# Patient Record
Sex: FEMALE | Race: WHITE | Employment: OTHER | URBAN - METROPOLITAN AREA
[De-identification: names, ages, dates, MRNs, and addresses within clinical notes are randomized per-mention and may not be internally consistent; named-entity substitution may affect disease eponyms.]

---

## 2019-07-08 ENCOUNTER — HOSPITAL ENCOUNTER (OUTPATIENT)
Age: 69
Discharge: HOME | End: 2019-07-10
Payer: MEDICARE

## 2019-07-08 ENCOUNTER — OFFICE VISIT (OUTPATIENT)
Dept: RHEUMATOLOGY | Age: 69
End: 2019-07-08
Payer: MEDICARE

## 2019-07-08 VITALS
HEIGHT: 66 IN | WEIGHT: 141.4 LBS | OXYGEN SATURATION: 98 % | BODY MASS INDEX: 22.73 KG/M2 | DIASTOLIC BLOOD PRESSURE: 68 MMHG | TEMPERATURE: 97.6 F | HEART RATE: 91 BPM | SYSTOLIC BLOOD PRESSURE: 124 MMHG

## 2019-07-08 DIAGNOSIS — D86.9 SARCOIDOSIS: Primary | ICD-10-CM

## 2019-07-08 DIAGNOSIS — D86.9 SARCOIDOSIS: ICD-10-CM

## 2019-07-08 LAB
ALBUMIN SERPL-MCNC: 3.6 G/DL (ref 3.5–5.2)
ALP BLD-CCNC: 80 U/L (ref 35–104)
ALT SERPL-CCNC: 10 U/L (ref 0–32)
ANION GAP SERPL CALCULATED.3IONS-SCNC: 16 MMOL/L (ref 7–16)
AST SERPL-CCNC: 22 U/L (ref 0–31)
BACTERIA: ABNORMAL /HPF
BASOPHILS ABSOLUTE: 0.03 E9/L (ref 0–0.2)
BASOPHILS RELATIVE PERCENT: 0.6 % (ref 0–2)
BILIRUB SERPL-MCNC: 0.3 MG/DL (ref 0–1.2)
BILIRUBIN URINE: NEGATIVE
BLOOD, URINE: NEGATIVE
BUN BLDV-MCNC: 33 MG/DL (ref 8–23)
C-REACTIVE PROTEIN: 3.7 MG/DL (ref 0–0.4)
CALCIUM SERPL-MCNC: 10.1 MG/DL (ref 8.6–10.2)
CHLORIDE BLD-SCNC: 103 MMOL/L (ref 98–107)
CLARITY: CLEAR
CO2: 24 MMOL/L (ref 22–29)
COLOR: YELLOW
CREAT SERPL-MCNC: 1.9 MG/DL (ref 0.5–1)
CREATININE URINE: 126 MG/DL (ref 29–226)
EOSINOPHILS ABSOLUTE: 0.35 E9/L (ref 0.05–0.5)
EOSINOPHILS RELATIVE PERCENT: 6.4 % (ref 0–6)
GFR AFRICAN AMERICAN: 32
GFR NON-AFRICAN AMERICAN: 26 ML/MIN/1.73
GLUCOSE BLD-MCNC: 99 MG/DL (ref 74–99)
GLUCOSE URINE: NEGATIVE MG/DL
HCT VFR BLD CALC: 32.9 % (ref 34–48)
HEMOGLOBIN: 10.3 G/DL (ref 11.5–15.5)
IMMATURE GRANULOCYTES #: 0.01 E9/L
IMMATURE GRANULOCYTES %: 0.2 % (ref 0–5)
KETONES, URINE: NEGATIVE MG/DL
LEUKOCYTE ESTERASE, URINE: ABNORMAL
LYMPHOCYTES ABSOLUTE: 2.56 E9/L (ref 1.5–4)
LYMPHOCYTES RELATIVE PERCENT: 47.1 % (ref 20–42)
MCH RBC QN AUTO: 30 PG (ref 26–35)
MCHC RBC AUTO-ENTMCNC: 31.3 % (ref 32–34.5)
MCV RBC AUTO: 95.9 FL (ref 80–99.9)
MONOCYTES ABSOLUTE: 0.47 E9/L (ref 0.1–0.95)
MONOCYTES RELATIVE PERCENT: 8.6 % (ref 2–12)
NEUTROPHILS ABSOLUTE: 2.02 E9/L (ref 1.8–7.3)
NEUTROPHILS RELATIVE PERCENT: 37.1 % (ref 43–80)
NITRITE, URINE: NEGATIVE
PDW BLD-RTO: 14 FL (ref 11.5–15)
PH UA: 6 (ref 5–9)
PLATELET # BLD: 183 E9/L (ref 130–450)
PMV BLD AUTO: 10.2 FL (ref 7–12)
POTASSIUM SERPL-SCNC: 4.6 MMOL/L (ref 3.5–5)
PROTEIN PROTEIN: 15 MG/DL (ref 0–12)
PROTEIN UA: NEGATIVE MG/DL
PROTEIN/CREAT RATIO: 0.1
PROTEIN/CREAT RATIO: 0.1 (ref 0–0.2)
RBC # BLD: 3.43 E12/L (ref 3.5–5.5)
RBC UA: ABNORMAL /HPF (ref 0–2)
RHEUMATOID FACTOR: 11 IU/ML (ref 0–13)
SEDIMENTATION RATE, ERYTHROCYTE: 30 MM/HR (ref 0–20)
SODIUM BLD-SCNC: 143 MMOL/L (ref 132–146)
SPECIFIC GRAVITY UA: 1.01 (ref 1–1.03)
TOTAL PROTEIN: 6.6 G/DL (ref 6.4–8.3)
URIC ACID, SERUM: 8 MG/DL (ref 2.4–5.7)
UROBILINOGEN, URINE: 0.2 E.U./DL
WBC # BLD: 5.4 E9/L (ref 4.5–11.5)
WBC UA: ABNORMAL /HPF (ref 0–5)

## 2019-07-08 PROCEDURE — 82164 ANGIOTENSIN I ENZYME TEST: CPT

## 2019-07-08 PROCEDURE — 86803 HEPATITIS C AB TEST: CPT

## 2019-07-08 PROCEDURE — 82570 ASSAY OF URINE CREATININE: CPT

## 2019-07-08 PROCEDURE — 86140 C-REACTIVE PROTEIN: CPT

## 2019-07-08 PROCEDURE — 86235 NUCLEAR ANTIGEN ANTIBODY: CPT

## 2019-07-08 PROCEDURE — 84550 ASSAY OF BLOOD/URIC ACID: CPT

## 2019-07-08 PROCEDURE — 85025 COMPLETE CBC W/AUTO DIFF WBC: CPT

## 2019-07-08 PROCEDURE — 36415 COLL VENOUS BLD VENIPUNCTURE: CPT

## 2019-07-08 PROCEDURE — 80053 COMPREHEN METABOLIC PANEL: CPT

## 2019-07-08 PROCEDURE — 86704 HEP B CORE ANTIBODY TOTAL: CPT

## 2019-07-08 PROCEDURE — 99203 OFFICE O/P NEW LOW 30 MIN: CPT | Performed by: INTERNAL MEDICINE

## 2019-07-08 PROCEDURE — 86255 FLUORESCENT ANTIBODY SCREEN: CPT

## 2019-07-08 PROCEDURE — 86431 RHEUMATOID FACTOR QUANT: CPT

## 2019-07-08 PROCEDURE — 86225 DNA ANTIBODY NATIVE: CPT

## 2019-07-08 PROCEDURE — 87340 HEPATITIS B SURFACE AG IA: CPT

## 2019-07-08 PROCEDURE — 84156 ASSAY OF PROTEIN URINE: CPT

## 2019-07-08 PROCEDURE — 85651 RBC SED RATE NONAUTOMATED: CPT

## 2019-07-08 PROCEDURE — 81001 URINALYSIS AUTO W/SCOPE: CPT

## 2019-07-08 PROCEDURE — 86706 HEP B SURFACE ANTIBODY: CPT

## 2019-07-08 PROCEDURE — 86200 CCP ANTIBODY: CPT

## 2019-07-08 RX ORDER — FLUOXETINE HYDROCHLORIDE 40 MG/1
CAPSULE ORAL
Refills: 0 | COMMUNITY
Start: 2019-05-02

## 2019-07-08 RX ORDER — PANTOPRAZOLE SODIUM 40 MG/1
20 TABLET, DELAYED RELEASE ORAL
Refills: 0 | COMMUNITY
Start: 2019-05-29

## 2019-07-08 RX ORDER — POTASSIUM CITRATE 10 MEQ/1
TABLET, EXTENDED RELEASE ORAL
Refills: 0 | COMMUNITY
Start: 2019-06-19

## 2019-07-08 RX ORDER — PERPHENAZINE AND AMITRIPTYLINE HYDROCHLORIDE 2; 10 MG/1; MG/1
TABLET, FILM COATED ORAL
Refills: 0 | COMMUNITY
Start: 2019-04-11

## 2019-07-08 RX ORDER — LEVOTHYROXINE SODIUM 0.07 MG/1
TABLET ORAL
Refills: 0 | COMMUNITY
Start: 2019-06-10

## 2019-07-08 RX ORDER — ATORVASTATIN CALCIUM 40 MG/1
TABLET, FILM COATED ORAL
Refills: 0 | COMMUNITY
Start: 2019-07-01

## 2019-07-08 ASSESSMENT — ENCOUNTER SYMPTOMS
EYE ITCHING: 0
PHOTOPHOBIA: 0
EYE REDNESS: 0
DIARRHEA: 1
COUGH: 0
SHORTNESS OF BREATH: 0
SORE THROAT: 0
CHEST TIGHTNESS: 0
VOMITING: 0
BACK PAIN: 0
ABDOMINAL DISTENTION: 1
CONSTIPATION: 0
ABDOMINAL PAIN: 0
WHEEZING: 0
EYE PAIN: 0
BLOOD IN STOOL: 0

## 2019-07-08 NOTE — PROGRESS NOTES
19    Name: Jaylen Talamantes  : 1950  Age: 81 y.o. Sex: female    Patient is seen at the request of Mindy Zapata MD    Patient presents for a rheumatology consultation regarding     Chief Complaint   Patient presents with    Rash     BLE    Joint Pain         Long standing history of Sarcoidosis . Please see following note for additional details. Never been seen by Rheumatologist and Pulmonologist before. Now she would like to establish with rheumatologist due to long standing history pain less , non itchy rash over low extremities. No aggravating and relieving factors. Never been seen by dermatologist for this rash. Mother with h/o sarcoidosis  \"64 year old woman, non-smoker, with bronchitis, episodic dyspnea and cough.    Found to have mediastinal lymphadenopathy and pulmonary nodules. CT scan    shows lymphadenopathy, small lung nodules and an enlarged spleen. PET scan    positive in lymph nodes and lung nodules.  Abdominal CT scan shows    peripancreatic lymphadenopathy. History of Raynaud's and nephrolithiasis.    Maternal history of sarcoidosis. \"Right paratracheal lymph node biopsy is consistent with non necrotizing granuloma. AFB , GMS, PAS, fungal stain is negative.                Vitals:    19 1418   BP: 124/68   Site: Right Upper Arm   Position: Sitting   Pulse: 91   Temp: 97.6 °F (36.4 °C)   TempSrc: Temporal   SpO2: 98%   Weight: 141 lb 6.4 oz (64.1 kg)   Height: 5' 5.5\" (1.664 m)        Review of Systems   Constitutional: Negative for fatigue, fever and unexpected weight change. HENT: Negative for mouth sores, nosebleeds and sore throat. Sinus congestion. Eyes: Negative for photophobia, pain, redness, itching and visual disturbance. Respiratory: Negative for cough, chest tightness, shortness of breath and wheezing. Cardiovascular: Negative for chest pain, palpitations and leg swelling. Gastrointestinal: Positive for abdominal distention and diarrhea.  Negative file    Years of education: Not on file    Highest education level: Not on file   Occupational History    Not on file   Social Needs    Financial resource strain: Not on file    Food insecurity:     Worry: Not on file     Inability: Not on file    Transportation needs:     Medical: Not on file     Non-medical: Not on file   Tobacco Use    Smoking status: Never Smoker    Smokeless tobacco: Never Used   Substance and Sexual Activity    Alcohol use: Not Currently    Drug use: Never    Sexual activity: Not Currently     Partners: Male     Birth control/protection: Post-menopausal   Lifestyle    Physical activity:     Days per week: Not on file     Minutes per session: Not on file    Stress: Not on file   Relationships    Social connections:     Talks on phone: Not on file     Gets together: Not on file     Attends [employer] service: Not on file     Active member of club or organization: Not on file     Attends meetings of clubs or organizations: Not on file     Relationship status: Not on file    Intimate partner violence:     Fear of current or ex partner: Not on file     Emotionally abused: Not on file     Physically abused: Not on file     Forced sexual activity: Not on file   Other Topics Concern    Not on file   Social History Narrative    Not on file      Social History     Social History Narrative    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 Skin:   Multiple areas of circular  erythematous, scaly  rashes over low extremities. Obi Manuel was seen today for rash and joint pain.     Diagnoses and all orders for this

## 2019-07-09 LAB
HBV SURFACE AB TITR SER: NORMAL {TITER}
HEPATITIS B SURFACE ANTIGEN INTERPRETATION: NORMAL
HEPATITIS C ANTIBODY INTERPRETATION: NORMAL

## 2019-07-10 LAB
ANGIOTENSIN CONVERTING ENZYME: 60 U/L (ref 9–67)
ANTI DNA DOUBLE STRANDED: NEGATIVE
CCP IGG ANTIBODIES: 14 UNITS (ref 0–19)

## 2019-07-11 LAB — HEPATITIS B CORE TOTAL ANTIBODY: NONREACTIVE

## 2019-07-12 LAB
ANTI JO-1 IGG: NEGATIVE
ANTI-MITOCHONDRIAL AB, IFA: NEGATIVE
ENA TO RNP ANTIBODY: NEGATIVE
ENA TO SMITH (SM) ANTIBODY: NEGATIVE
ENA TO SSA (RO) ANTIBODY: NEGATIVE
ENA TO SSB (LA) ANTIBODY: NEGATIVE
SCLERODERMA (SCL-70) AB: NEGATIVE

## 2019-07-18 ENCOUNTER — OFFICE VISIT (OUTPATIENT)
Dept: RHEUMATOLOGY | Age: 69
End: 2019-07-18
Payer: MEDICARE

## 2019-07-18 VITALS
OXYGEN SATURATION: 99 % | SYSTOLIC BLOOD PRESSURE: 120 MMHG | WEIGHT: 138.8 LBS | DIASTOLIC BLOOD PRESSURE: 64 MMHG | HEIGHT: 66 IN | BODY MASS INDEX: 22.31 KG/M2 | HEART RATE: 94 BPM | TEMPERATURE: 97.7 F

## 2019-07-18 DIAGNOSIS — D86.9 SARCOIDOSIS: ICD-10-CM

## 2019-07-18 PROCEDURE — 99214 OFFICE O/P EST MOD 30 MIN: CPT | Performed by: INTERNAL MEDICINE

## 2019-07-18 RX ORDER — BETHANECHOL CHLORIDE 25 MG/1
TABLET ORAL
Refills: 0 | COMMUNITY
Start: 2019-07-08

## 2019-07-18 ASSESSMENT — ENCOUNTER SYMPTOMS
PHOTOPHOBIA: 0
VOMITING: 0
BACK PAIN: 0
ABDOMINAL DISTENTION: 1
COUGH: 0
EYE REDNESS: 0
EYE ITCHING: 0
SHORTNESS OF BREATH: 0
DIARRHEA: 1
SORE THROAT: 0
ABDOMINAL PAIN: 0
EYE PAIN: 0
CONSTIPATION: 0
WHEEZING: 0
BLOOD IN STOOL: 0
CHEST TIGHTNESS: 0

## 2021-06-21 LAB
ANION GAP SERPL CALCULATED.3IONS-SCNC: 11 MEQ/L (ref 3–11)
BANDED NEUTROPHILS RELATIVE PERCENT: 2 % (ref 0–6)
BASOPHILS ABSOLUTE: 0.17 K/UL (ref 0–0.2)
BASOPHILS RELATIVE PERCENT: 2 % (ref 0–1.5)
BUN BLDV-MCNC: 36 MG/DL (ref 8–21)
CALCIUM SERPL-MCNC: 9.8 MG/DL (ref 8.5–10.5)
CELLS COUNTED: 100
CHLORIDE BLD-SCNC: 102 MEQ/L (ref 98–107)
CO2: 26 MEQ/L (ref 21–31)
CREAT SERPL-MCNC: 1.9 MG/DL (ref 0.4–1)
CREATININE + EGFR PANEL: 32 ML/MIN
CREATININE URINE: 102.4 MG/DL (ref 22–328)
DIFFERENTIAL, MANUAL: MANUAL DIFF
EOSINOPHILS ABSOLUTE: 0.26 K/UL (ref 0–0.33)
EOSINOPHILS RELATIVE PERCENT: 3 % (ref 0–3)
GFR NON-AFRICAN AMERICAN: 26 ML/MIN
GLUCOSE BLD-MCNC: 110 MG/DL (ref 70–99)
HCT VFR BLD CALC: 31.6 % (ref 36–44)
HEMOGLOBIN: 10.6 G/DL (ref 12–15)
LYMPHOCYTES # BLD: 55 % (ref 24–44)
LYMPHOCYTES ABSOLUTE: 4.78 K/UL (ref 1.1–4.8)
MCH RBC QN AUTO: 31.9 PG (ref 28–34)
MCHC RBC AUTO-ENTMCNC: 33.6 G/DL (ref 33–37)
MCV RBC AUTO: 95 FL (ref 80–100)
MONOCYTES # BLD: 5 % (ref 3.4–9)
MONOCYTES ABSOLUTE: 0.43 K/UL (ref 0.2–0.7)
NEUTROPHILS ABSOLUTE: 3.04 K/UL (ref 1.83–8.7)
PDW BLD-RTO: 14.5 % (ref 10.9–14.3)
PLATELET # BLD: 218 K/UL (ref 150–450)
PLATELET ESTIMATE: ABNORMAL
PMV BLD AUTO: 7.1 FL (ref 7.4–10.4)
POTASSIUM SERPL-SCNC: 4.3 MEQ/L (ref 3.6–5)
PROTEIN/CREAT RATIO URINE RAN: 88 MG/G
RBC # BLD: 3.33 M/UL (ref 4–4.9)
RBC COMMENT: ABNORMAL
SEG NEUTROPHILS: 33 % (ref 40–74)
SODIUM BLD-SCNC: 139 MEQ/L (ref 135–145)
TOTAL PROTEIN, URINE: 9 MG/DL
WBC # BLD: 8.7 K/UL (ref 4.5–11)

## 2021-10-05 LAB
25-HYDROXY VITAMIN D-3: 64.9 NG/ML (ref 30–100)
A/G RATIO: 1.9 RATIO (ref 1.1–2.2)
ALBUMIN SERPL-MCNC: 4.3 G/DL (ref 3.4–4.8)
ALP BLD-CCNC: 59 U/L (ref 42–121)
ALT SERPL-CCNC: 17 U/L (ref 10–54)
ANION GAP SERPL CALCULATED.3IONS-SCNC: 9 MEQ/L (ref 3–11)
AST SERPL-CCNC: 27 U/L (ref 10–41)
BANDED NEUTROPHILS RELATIVE PERCENT: 4 % (ref 0–6)
BASOPHILS ABSOLUTE: 0.11 K/UL (ref 0–0.2)
BASOPHILS RELATIVE PERCENT: 2 % (ref 0–1.5)
BILIRUB SERPL-MCNC: 0.8 MG/DL (ref 0.3–1.5)
BUN BLDV-MCNC: 29 MG/DL (ref 8–21)
CALCIUM SERPL-MCNC: 10.2 MG/DL (ref 8.5–10.5)
CELLS COUNTED: 100
CHLORIDE BLD-SCNC: 103 MEQ/L (ref 98–107)
CO2: 29 MEQ/L (ref 21–31)
CREAT SERPL-MCNC: 1.9 MG/DL (ref 0.4–1)
CREATININE + EGFR PANEL: 32 ML/MIN
CREATININE URINE: 107.8 MG/DL (ref 22–328)
DIFFERENTIAL, MANUAL: MANUAL DIFF
EOSINOPHILS ABSOLUTE: 0.38 K/UL (ref 0–0.33)
EOSINOPHILS RELATIVE PERCENT: 7 % (ref 0–3)
GFR NON-AFRICAN AMERICAN: 26 ML/MIN
GLOBULIN: 2.3 G/DL (ref 1.9–3.9)
GLUCOSE BLD-MCNC: 85 MG/DL (ref 70–99)
HCT VFR BLD CALC: 33.8 % (ref 36–44)
HEMOGLOBIN: 11.3 G/DL (ref 12–15)
LYMPHOCYTES # BLD: 50 % (ref 24–44)
LYMPHOCYTES ABSOLUTE: 2.75 K/UL (ref 1.1–4.8)
MAGNESIUM: 1.5 MEQ/L (ref 1.6–2.6)
MCH RBC QN AUTO: 31.1 PG (ref 28–34)
MCHC RBC AUTO-ENTMCNC: 33.6 G/DL (ref 33–37)
MCV RBC AUTO: 92.8 FL (ref 80–100)
MONOCYTES # BLD: 3 % (ref 3.4–9)
MONOCYTES ABSOLUTE: 0.16 K/UL (ref 0.2–0.7)
NEUTROPHILS ABSOLUTE: 2.09 K/UL (ref 1.83–8.7)
PDW BLD-RTO: 14.2 % (ref 10.9–14.3)
PHOSPHORUS: 3.2 MG/DL (ref 2.5–4.6)
PLATELET # BLD: 161 K/UL (ref 150–450)
PLATELET ESTIMATE: ABNORMAL
PMV BLD AUTO: 6.9 FL (ref 7.4–10.4)
POTASSIUM SERPL-SCNC: 4.5 MEQ/L (ref 3.6–5)
PROTEIN/CREAT RATIO URINE RAN: 130 MG/G
RBC # BLD: 3.64 M/UL (ref 4–4.9)
RBC COMMENT: ABNORMAL
SEG NEUTROPHILS: 34 % (ref 40–74)
SODIUM BLD-SCNC: 141 MEQ/L (ref 135–145)
TOTAL PROTEIN, URINE: 14 MG/DL
TOTAL PROTEIN: 6.6 G/DL (ref 5.9–7.8)
URATE: 7.7 MG/DL (ref 2.3–6.6)
WBC # BLD: 5.5 K/UL (ref 4.5–11)

## 2021-10-07 LAB — VITAMIN D 1,25-DIHYDROXY: 85 PG/ML (ref 19.9–79.3)

## 2022-03-29 LAB
A/G RATIO: 1.7 RATIO (ref 1.1–2.2)
ALBUMIN SERPL-MCNC: 3.7 G/DL (ref 3.4–4.8)
ALP BLD-CCNC: 63 U/L (ref 42–121)
ALT SERPL-CCNC: 14 U/L (ref 10–54)
ANION GAP SERPL CALCULATED.3IONS-SCNC: 9 MEQ/L (ref 3–11)
AST SERPL-CCNC: 23 U/L (ref 10–41)
BANDED NEUTROPHILS RELATIVE PERCENT: 0 % (ref 0–6)
BASOPHILS ABSOLUTE: 0 K/UL (ref 0–0.2)
BASOPHILS RELATIVE PERCENT: 0 % (ref 0–1.5)
BILIRUB SERPL-MCNC: 0.4 MG/DL (ref 0.3–1.5)
BUN BLDV-MCNC: 31 MG/DL (ref 8–21)
CALCIUM SERPL-MCNC: 9.4 MG/DL (ref 8.5–10.5)
CELLS COUNTED: 100
CHLORIDE BLD-SCNC: 102 MEQ/L (ref 98–107)
CO2: 26 MEQ/L (ref 21–31)
CREAT SERPL-MCNC: 2.1 MG/DL (ref 0.4–1)
CREATININE + EGFR PANEL: 28 ML/MIN
CREATININE URINE: 194.8 MG/DL (ref 22–328)
DIFFERENTIAL, MANUAL: MANUAL DIFF
EOSINOPHILS ABSOLUTE: 0.62 K/UL (ref 0–0.33)
EOSINOPHILS RELATIVE PERCENT: 6 % (ref 0–3)
GFR NON-AFRICAN AMERICAN: 23 ML/MIN
GLOBULIN: 2.2 G/DL (ref 1.9–3.9)
GLUCOSE BLD-MCNC: 81 MG/DL (ref 70–99)
HCT VFR BLD CALC: 31.1 % (ref 36–44)
HEMOGLOBIN: 10.1 G/DL (ref 12–15)
LYMPHOCYTES # BLD: 42 % (ref 24–44)
LYMPHOCYTES ABSOLUTE: 4.36 K/UL (ref 1.1–4.8)
MAGNESIUM: 1.5 MEQ/L (ref 1.6–2.6)
MCH RBC QN AUTO: 30.9 PG (ref 28–34)
MCHC RBC AUTO-ENTMCNC: 32.6 G/DL (ref 33–37)
MCV RBC AUTO: 94.9 FL (ref 80–100)
MICROALBUMIN UR-MCNC: 48.6 UG/ML
MICROALBUMIN/CREAT UR-RTO: 24.9 MG/G
MONOCYTES # BLD: 6 % (ref 3.4–9)
MONOCYTES ABSOLUTE: 0.62 K/UL (ref 0.2–0.7)
NEUTROPHILS ABSOLUTE: 4.78 K/UL (ref 1.83–8.7)
PDW BLD-RTO: 14.5 % (ref 10.9–14.3)
PHOSPHORUS: 3.5 MG/DL (ref 2.5–4.6)
PLATELET # BLD: 195 K/UL (ref 150–450)
PLATELET ESTIMATE: ABNORMAL
PMV BLD AUTO: 6.9 FL (ref 7.4–10.4)
POTASSIUM SERPL-SCNC: 4.9 MEQ/L (ref 3.6–5)
PROTEIN/CREAT RATIO URINE RAN: 77 MG/G
RBC # BLD: 3.28 M/UL (ref 4–4.9)
RBC COMMENT: ABNORMAL
SEG NEUTROPHILS: 46 % (ref 40–74)
SODIUM BLD-SCNC: 137 MEQ/L (ref 135–145)
TOTAL PROTEIN, URINE: 15 MG/DL
TOTAL PROTEIN: 5.9 G/DL (ref 5.9–7.8)
URATE: 8 MG/DL (ref 2.3–6.6)
WBC # BLD: 10.4 K/UL (ref 4.5–11)

## 2022-08-16 LAB
A/G RATIO: 1.7 RATIO (ref 1.1–2.2)
ALBUMIN SERPL-MCNC: 3.9 G/DL (ref 3.4–4.8)
ALP BLD-CCNC: 57 U/L (ref 42–121)
ALT SERPL-CCNC: 14 U/L (ref 10–54)
ANION GAP SERPL CALCULATED.3IONS-SCNC: 5 MEQ/L (ref 3–11)
AST SERPL-CCNC: 21 U/L (ref 10–41)
BANDED NEUTROPHILS RELATIVE PERCENT: 1 % (ref 0–6)
BASOPHILS ABSOLUTE: 0.05 K/UL (ref 0–0.2)
BASOPHILS RELATIVE PERCENT: 1 % (ref 0–1.5)
BILIRUB SERPL-MCNC: 0.7 MG/DL (ref 0.3–1.5)
BUN BLDV-MCNC: 30 MG/DL (ref 8–21)
CALCIUM SERPL-MCNC: 9.5 MG/DL (ref 8.5–10.5)
CELLS COUNTED: 100
CHLORIDE BLD-SCNC: 102 MEQ/L (ref 98–107)
CO2: 31 MEQ/L (ref 21–31)
CREAT SERPL-MCNC: 1.9 MG/DL (ref 0.4–1)
CREATININE + EGFR PANEL: 32 ML/MIN
CREATININE URINE: 143.1 MG/DL (ref 22–328)
DIFFERENTIAL, MANUAL: MANUAL DIFF
EOSINOPHILS ABSOLUTE: 0.27 K/UL (ref 0–0.33)
EOSINOPHILS RELATIVE PERCENT: 5 % (ref 0–3)
GFR NON-AFRICAN AMERICAN: 26 ML/MIN
GLOBULIN: 2.3 G/DL (ref 1.9–3.9)
GLUCOSE BLD-MCNC: 96 MG/DL (ref 70–99)
HCT VFR BLD CALC: 33.1 % (ref 36–44)
HEMOGLOBIN: 10.8 G/DL (ref 12–15)
LYMPHOCYTES # BLD: 48 % (ref 24–44)
LYMPHOCYTES ABSOLUTE: 2.59 K/UL (ref 1.1–4.8)
MCH RBC QN AUTO: 31 PG (ref 28–34)
MCHC RBC AUTO-ENTMCNC: 32.7 G/DL (ref 33–37)
MCV RBC AUTO: 94.7 FL (ref 80–100)
MONOCYTES # BLD: 5 % (ref 3.4–9)
MONOCYTES ABSOLUTE: 0.27 K/UL (ref 0.2–0.7)
NEUTROPHILS ABSOLUTE: 2.21 K/UL (ref 1.83–8.7)
PDW BLD-RTO: 14.2 % (ref 10.9–14.3)
PHOSPHORUS: 3.6 MG/DL (ref 2.5–4.6)
PLATELET # BLD: 171 K/UL (ref 150–450)
PLATELET ESTIMATE: ABNORMAL
PMV BLD AUTO: 7.7 FL (ref 7.4–10.4)
POTASSIUM SERPL-SCNC: 4.6 MEQ/L (ref 3.6–5)
PROTEIN/CREAT RATIO URINE RAN: 77 MG/G
RBC # BLD: 3.5 M/UL (ref 4–4.9)
RBC COMMENT: ABNORMAL
SEG NEUTROPHILS: 40 % (ref 40–74)
SODIUM BLD-SCNC: 138 MEQ/L (ref 135–145)
TOTAL PROTEIN, URINE: 11 MG/DL
TOTAL PROTEIN: 6.2 G/DL (ref 5.9–7.8)
URATE: 7.4 MG/DL (ref 2.3–6.6)
WBC # BLD: 5.4 K/UL (ref 4.5–11)

## 2022-12-12 ENCOUNTER — OFFICE VISIT (OUTPATIENT)
Dept: PULMONOLOGY | Age: 72
End: 2022-12-12
Payer: MEDICARE

## 2022-12-12 VITALS
OXYGEN SATURATION: 100 % | DIASTOLIC BLOOD PRESSURE: 68 MMHG | TEMPERATURE: 97.5 F | RESPIRATION RATE: 26 BRPM | HEIGHT: 66 IN | WEIGHT: 127 LBS | HEART RATE: 90 BPM | BODY MASS INDEX: 20.41 KG/M2 | SYSTOLIC BLOOD PRESSURE: 126 MMHG

## 2022-12-12 DIAGNOSIS — R59.9 ADENOPATHY: ICD-10-CM

## 2022-12-12 DIAGNOSIS — R05.3 CHRONIC COUGH: ICD-10-CM

## 2022-12-12 DIAGNOSIS — Z85.3 HISTORY OF BREAST CANCER: ICD-10-CM

## 2022-12-12 DIAGNOSIS — D86.9 SARCOIDOSIS: Primary | ICD-10-CM

## 2022-12-12 PROCEDURE — 99203 OFFICE O/P NEW LOW 30 MIN: CPT | Performed by: INTERNAL MEDICINE

## 2022-12-12 PROCEDURE — 1123F ACP DISCUSS/DSCN MKR DOCD: CPT | Performed by: INTERNAL MEDICINE

## 2022-12-12 NOTE — PROGRESS NOTES
Patient will be scheduled for a  bronch with EBUS. Patient was ordered labs that she will have done in [facility]. Record will be obtained from Dr. Adwoa Riggs in [facility]. [phone]  Scripts for labs given to patient's sister.

## 2022-12-12 NOTE — PATIENT INSTRUCTIONS
[facility]  [address] [facility], [address]  Office: [phone]      Your were seen in the office today for Sarcoidosis, enlarged lymph nodes      We  did not make changes to your medications today. Testing ordered today was Labwork      Vaccines recommended none    We will schedule you for a bronchoscopy with endobronchial ultrasound and biopsy after Jan 1st 2023. Please do not hesitate to call the office with any questions.

## 2022-12-13 NOTE — PROGRESS NOTES
[facility]     HISTORY OF PRESENT ILLNESS:    Hodan Broussard is a 81y.o. year old female here for evaluation of adenopathy with increased uptake on PET scan. Patient seen and examined she is accompanied by her sister and her . She was previously with sarcoidosis approximately 10 years ago also previously had breast cancer in 2009. Apparently for the last few months she has had increased coughing and shortness of breath she was being seen by Dr. Alley Rcie nurse practitioner Cammie Chapman. A CT of the chest had been ordered which had shown some adenopathy and some very small nodules a PET scan was then ordered which did show uptake in numerous areas. The patient denies any fevers, chills, night sweats, unintentional weight loss. She does continue to have a persistent cough. She was recently seen by a dermatologist and reports that the lesions on her legs were secondary to sarcoidosis. ALLERGIES:    Allergies   Allergen Reactions    Demerol Hcl [Meperidine]        PAST MEDICAL HISTORY: No past medical history on file. MEDICATIONS:   Current Outpatient Medications   Medication Sig Dispense Refill    ipratropium-albuterol (DUONEB) 0.5-2.5 (3) MG/3ML SOLN nebulizer solution Inhale 3 mLs into the lungs every 4 hours 480 mL 4    bethanechol (URECHOLINE) 25 MG tablet take 1 tablet by mouth twice a day  0    atorvastatin (LIPITOR) 40 MG tablet take 1 tablet by mouth once daily  0    FLUoxetine (PROZAC) 40 MG capsule   0    levothyroxine (SYNTHROID) 75 MCG tablet   0    pantoprazole (PROTONIX) 40 MG tablet 20 mg   0    perphenazine-amitriptyline (ETRAFON;TRIAVIL) 2-10 MG per tablet TAKE 1 TABLET BY MOUTH AT BEDTIME  0    potassium citrate (UROCIT-K) 10 MEQ (1080 MG) extended release tablet TAKE 2 TABLETS BY MOUTH TWICE DAILY  0     No current facility-administered medications for this visit.        SOCIAL AND OCCUPATIONAL HEALTH: The patient is a non-smoker[place] She currently has no pets she denies any occupational exposure    SURGICAL HISTORY:   Past Surgical History:   Procedure Laterality Date    BREAST LUMPECTOMY Right     REFRACTIVE SURGERY         FAMILY HISTORY: No family history of cancer, blood clots patient's mother did have sarcoidosis    REVIEW OF SYSTEMS:  Constitutional: No fevers, chills, unintentional weight loss  Skin: Positive for multiple areas of hyperpigmentation on the bilateral lower extremities. There is also a wound from a prior skin biopsy  EENT: No change in vision, change in hearing, change in taste, change in smell  Cardiovascular: Denies chest pain, chest pressure, palpitations  Respiration: Denies wheezing, shortness of breath, positive for cough, denies hemoptysis  Gastrointestinal: Denies nausea, vomiting, diarrhea  Musculoskeletal: Denies joint or muscle pain  Neurological: Denies syncope, headache, seizures  Psychological: Denies anxiety or depression  Endocrine: Denies polyuria polydipsia  Hematopoietic/lymphatic: Denies easy bruising        PHYSICAL EXAMINATION:  Constitutional: Well-nourished, well-developed. No acute distress  EENT: PERRL, EOMI, no oropharyngeal erythema. No palpable adenopathy  Neck: Trachea and thyroid midline  Respiratory: Overall diminished bilaterally patient with dry cough  Cardiovascular: Regular rate and rhythm, no murmurs rubs or gallops  Pulses: Equal bilaterally  Abdomen: Soft nontender bowel sounds present  Lymphatic: No palpable adenopathy  Musculoskeletal: Gait slightly unsteady. Extremities: Patient with swelling of left leg noted per the patient and her sister they report that this is much improved. She does have approximately 1 cm x 1 cm wound on the anterior shin that they report is from a previous biopsy. Skin: Multiple hyperpigmented areas of lower extremities bilaterally  Neurological/Psychiatric: Neurologically intact, no focal deficits. Affect appropriate    DATA: No spirometry completed at this visit as patient has prior spirometry from Dr. Chelsey Darnell office    IMPRESSION:       1. Chronic cough  2. History of sarcoidosis  3. PET scan with positive uptake in superior mediastinal, anterior mediastinal, pretracheal, paratracheal, periaortic, and subcarinal lymph nodes. PLAN:      1. Risks and benefits of bronchoscopy with endobronchial ultrasound and fine-needle aspiration discussed with patient and her  and her sister. They are agreeable to proceed at this time per their request they will be scheduled after the first of the year. 2.  Case discussed with Dr. Clarence Thompson based on patient's prior history likely consistent with her former diagnosis of sarcoidosis however given her history of breast cancer biopsy is recommended at this time  3. Patient to continue DuoNebs and inhalers as per her primary pulmonologist  4. Rheumatoid factor, ACE level, CMP, and urine calcium ordered      I hope this updates you on my evaluation and clinical thinking. Thank you for allowing me to participate in his care.      Sincerely,        Kate Zaidi.  Office: [phone]  Fax: [phone]

## 2023-01-09 RX ORDER — VITAMIN B COMPLEX
1 CAPSULE ORAL DAILY
COMMUNITY

## 2023-01-09 RX ORDER — AMITRIPTYLINE HYDROCHLORIDE 10 MG/1
20 TABLET, FILM COATED ORAL NIGHTLY
COMMUNITY

## 2023-01-09 RX ORDER — MEMANTINE HYDROCHLORIDE 5 MG/1
5 TABLET ORAL DAILY
COMMUNITY

## 2023-01-09 NOTE — PROGRESS NOTES
[address]   PRE-ADMISSION TESTING GENERAL INSTRUCTIONS  [facility] Phone Number: [phone]      GENERAL INSTRUCTIONS:    [x] Antibacterial Soap Shower Night before or AM of surgery. [] CHG Wipes instruction sheet and wipes given. [] Hibiclens shower the night before and the morning of surgery.   -Do not use Hibiclens on your face or head. [x] Do not wear makeup, lotions, powders, deodorant. [x] Nothing by mouth after midnight; including gum, candy, mints, or water. [x] You may brush your teeth, gargle, but do NOT swallow water. [x] No tobacco products, illegal drugs, or alcohol within 24 hours of your surgery. [x] Jewelry or valuables should not be brought to the hospital. All body and/or tongue piercing's must be removed prior to arriving to hospital. No contact lens or hair pins. [x] Arrange transportation with a responsible adult  to and from the hospital. Arrange for someone to be with you for the remainder of the day and for 24 hours after your procedure due to having had anesthesia when discharged.           -Who will be your  for transportation? Claudia-sister         -Who will be staying with you for 24 hrs after your procedure? Claudia-sister  [x] "[employer] card and photo ID. [x] Bring copy of living will or healthcare power of  papers to be placed in your electronic record. [] Urine Pregnancy test will be preformed the day of surgery. A specimen sample may be brought from home. [] Transfusion Bracelet: Please bring with you to hospital, day of surgery. PARKING INSTRUCTIONS:     [x] ARRIVAL DATE & TIME:  1/13 at 10:40 am    [x] Enter into the [facility]. Two people may accompany you. Masks are not required but are recommended. [x] Parking Lot \"I\" is where you will park. It is located on the corner of [facility] [facility] and [address] [facility]. The entrance is on [address].    Upon entering the parking lot, a voucher ticket will print    EDUCATION INSTRUCTIONS:        [] Knee or Hip replacement booklet & exercise pamphlets given. [] [address] placed in chart. [] Pre-admission Testing educational folder given  [] Incentive Spirometry,coughing & deep breathing exercises reviewed. [] Medication information sheet(s)   [] Fluoroscopy-Xray used in surgery reviewed with patient. Educational pamphlet placed in chart. [] Pain: Post-op pain is normal and to be expected. You will be asked to rate your pain from 0-10. [] Joint camp offered. [] Joint replacement booklets given.  [] Spine Navigator to see in PAT. [] Other:___________________________    MEDICATION INSTRUCTIONS:    [x] Bring a complete list of your medications, please write the last time you took the medicine, give this list to the nurse in Pre-Op. [x] Take only the following medications the morning of surgery with 1-2 ounces of water:  Prozac; Protonix    [x] Stop all herbal supplements and vitamins 5 days before surgery. Stop NSAIDS 7 days before surgery. [] DO NOT take any diabetic medicine the morning of surgery. Follow instructions for insulin the day before surgery. [] If you are diabetic and your blood sugar is low or you feel symptomatic, you may drink 1-2 ounces of apple juice or take a glucose tablet.            -The morning of your procedure, you may call the pre-op area if you have concerns about your blood sugar [phone]. [x] Use your inhalers the morning of surgery. Bring your emergency inhaler with you day of surgery. Duoneb nebulizer if needed  [x] Follow physician instructions regarding any blood thinners you may be taking. WHAT TO EXPECT:    [x] The day of surgery you will be greeted and checked in by the [employer].  In addition, you will be registered in the [facility] by a Patient Access Representative. Please bring your photo ID and insurance card.  A nurse will greet you in accordance to the time you are needed in the pre-op area to prepare you for surgery. Please do not be discouraged if you are not greeted in the order you arrive as there are many variables that are involved in patient preparation. Your patience is greatly appreciated as you wait for your nurse. Please bring in items such as: books, magazines, newspapers, electronics, or any other items  to occupy your time in the waiting area. [x]  Delays may occur with surgery and staff will make a sincere effort to keep you informed of delays. If any delays occur with your procedure, we apologize ahead of time for your inconvenience as we recognize the value of your time.

## 2023-01-12 ENCOUNTER — ANESTHESIA EVENT (OUTPATIENT)
Dept: ENDOSCOPY | Age: 73
End: 2023-01-12
Payer: MEDICARE

## 2023-01-13 ENCOUNTER — HOSPITAL ENCOUNTER (OUTPATIENT)
Age: 73
Setting detail: OUTPATIENT SURGERY
Discharge: HOME | End: 2023-01-13
Attending: INTERNAL MEDICINE | Admitting: INTERNAL MEDICINE
Payer: MEDICARE

## 2023-01-13 ENCOUNTER — ANESTHESIA (OUTPATIENT)
Dept: ENDOSCOPY | Age: 73
End: 2023-01-13
Payer: MEDICARE

## 2023-01-13 VITALS
DIASTOLIC BLOOD PRESSURE: 63 MMHG | HEART RATE: 70 BPM | HEIGHT: 65 IN | TEMPERATURE: 97.1 F | WEIGHT: 126 LBS | OXYGEN SATURATION: 96 % | SYSTOLIC BLOOD PRESSURE: 136 MMHG | RESPIRATION RATE: 14 BRPM | BODY MASS INDEX: 20.99 KG/M2

## 2023-01-13 DIAGNOSIS — R91.1 COIN LESION: ICD-10-CM

## 2023-01-13 LAB
APPEARANCE FLUID: NORMAL
CELL COUNT FLUID TYPE: NORMAL
COLOR FLUID: COLORLESS
EKG ATRIAL RATE: 75 BPM
EKG P AXIS: 62 DEGREES
EKG P-R INTERVAL: 152 MS
EKG Q-T INTERVAL: 400 MS
EKG QRS DURATION: 88 MS
EKG QTC CALCULATION (BAZETT): 446 MS
EKG R AXIS: 34 DEGREES
EKG T AXIS: 54 DEGREES
EKG VENTRICULAR RATE: 75 BPM
HCT VFR BLD CALC: 32.4 % (ref 34–48)
HEMOGLOBIN: 10.4 G/DL (ref 11.5–15.5)
INR BLD: 1.1
MCH RBC QN AUTO: 29.9 PG (ref 26–35)
MCHC RBC AUTO-ENTMCNC: 32.1 % (ref 32–34.5)
MCV RBC AUTO: 93.1 FL (ref 80–99.9)
MONOCYTE, FLUID: 67 %
NEUTROPHIL, FLUID: 33 %
NUCLEATED CELLS FLUID: 198 /UL
PDW BLD-RTO: 14.2 FL (ref 11.5–15)
PLATELET # BLD: 245 E9/L (ref 130–450)
PMV BLD AUTO: 8.9 FL (ref 7–12)
PROTHROMBIN TIME: 11.9 SEC (ref 9.3–12.4)
RBC # BLD: 3.48 E12/L (ref 3.5–5.5)
RBC FLUID: <2000 /UL
WBC # BLD: 9.1 E9/L (ref 4.5–11.5)

## 2023-01-13 PROCEDURE — 7100000001 HC PACU RECOVERY - ADDTL 15 MIN: Performed by: INTERNAL MEDICINE

## 2023-01-13 PROCEDURE — 2500000003 HC RX 250 WO HCPCS

## 2023-01-13 PROCEDURE — 87186 SC STD MICRODIL/AGAR DIL: CPT

## 2023-01-13 PROCEDURE — 85610 PROTHROMBIN TIME: CPT

## 2023-01-13 PROCEDURE — 31624 DX BRONCHOSCOPE/LAVAGE: CPT | Performed by: INTERNAL MEDICINE

## 2023-01-13 PROCEDURE — 88173 CYTOPATH EVAL FNA REPORT: CPT

## 2023-01-13 PROCEDURE — 31645 BRNCHSC W/THER ASPIR 1ST: CPT | Performed by: INTERNAL MEDICINE

## 2023-01-13 PROCEDURE — 3609020000 HC BRONCHOSCOPY W/EBUS FNA: Performed by: INTERNAL MEDICINE

## 2023-01-13 PROCEDURE — 3700000001 HC ADD 15 MINUTES (ANESTHESIA): Performed by: INTERNAL MEDICINE

## 2023-01-13 PROCEDURE — 87102 FUNGUS ISOLATION CULTURE: CPT

## 2023-01-13 PROCEDURE — 7100000011 HC PHASE II RECOVERY - ADDTL 15 MIN: Performed by: INTERNAL MEDICINE

## 2023-01-13 PROCEDURE — 2709999900 HC NON-CHARGEABLE SUPPLY: Performed by: INTERNAL MEDICINE

## 2023-01-13 PROCEDURE — 6360000002 HC RX W HCPCS: Performed by: INTERNAL MEDICINE

## 2023-01-13 PROCEDURE — 93010 ELECTROCARDIOGRAM REPORT: CPT | Performed by: INTERNAL MEDICINE

## 2023-01-13 PROCEDURE — 3609010800 HC BRONCHOSCOPY ALVEOLAR LAVAGE: Performed by: INTERNAL MEDICINE

## 2023-01-13 PROCEDURE — 89051 BODY FLUID CELL COUNT: CPT

## 2023-01-13 PROCEDURE — 36415 COLL VENOUS BLD VENIPUNCTURE: CPT

## 2023-01-13 PROCEDURE — 31652 BRONCH EBUS SAMPLNG 1/2 NODE: CPT | Performed by: INTERNAL MEDICINE

## 2023-01-13 PROCEDURE — 87205 SMEAR GRAM STAIN: CPT

## 2023-01-13 PROCEDURE — 87015 SPECIMEN INFECT AGNT CONCNTJ: CPT

## 2023-01-13 PROCEDURE — 2580000003 HC RX 258: Performed by: INTERNAL MEDICINE

## 2023-01-13 PROCEDURE — 93005 ELECTROCARDIOGRAM TRACING: CPT | Performed by: ANESTHESIOLOGY

## 2023-01-13 PROCEDURE — 3700000000 HC ANESTHESIA ATTENDED CARE: Performed by: INTERNAL MEDICINE

## 2023-01-13 PROCEDURE — 85027 COMPLETE CBC AUTOMATED: CPT

## 2023-01-13 PROCEDURE — 7100000000 HC PACU RECOVERY - FIRST 15 MIN: Performed by: INTERNAL MEDICINE

## 2023-01-13 PROCEDURE — 87077 CULTURE AEROBIC IDENTIFY: CPT

## 2023-01-13 PROCEDURE — 88305 TISSUE EXAM BY PATHOLOGIST: CPT

## 2023-01-13 PROCEDURE — 3609027000 HC BRONCHOSCOPY: Performed by: INTERNAL MEDICINE

## 2023-01-13 PROCEDURE — 88112 CYTOPATH CELL ENHANCE TECH: CPT

## 2023-01-13 PROCEDURE — 87116 MYCOBACTERIA CULTURE: CPT

## 2023-01-13 PROCEDURE — 7100000010 HC PHASE II RECOVERY - FIRST 15 MIN: Performed by: INTERNAL MEDICINE

## 2023-01-13 PROCEDURE — 2580000003 HC RX 258

## 2023-01-13 PROCEDURE — 6360000002 HC RX W HCPCS

## 2023-01-13 PROCEDURE — 87206 SMEAR FLUORESCENT/ACID STAI: CPT

## 2023-01-13 PROCEDURE — 87070 CULTURE OTHR SPECIMN AEROBIC: CPT

## 2023-01-13 RX ORDER — SODIUM CHLORIDE 0.9 % (FLUSH) 0.9 %
5-40 SYRINGE (ML) INJECTION EVERY 12 HOURS SCHEDULED
Status: DISCONTINUED | OUTPATIENT
Start: 2023-01-13 | End: 2023-01-13 | Stop reason: HOSPADM

## 2023-01-13 RX ORDER — SODIUM CHLORIDE 0.9 % (FLUSH) 0.9 %
5-40 SYRINGE (ML) INJECTION PRN
Status: DISCONTINUED | OUTPATIENT
Start: 2023-01-13 | End: 2023-01-13 | Stop reason: HOSPADM

## 2023-01-13 RX ORDER — LIDOCAINE HYDROCHLORIDE 20 MG/ML
INJECTION, SOLUTION INTRAVENOUS PRN
Status: DISCONTINUED | OUTPATIENT
Start: 2023-01-13 | End: 2023-01-13 | Stop reason: SDUPTHER

## 2023-01-13 RX ORDER — NEOSTIGMINE METHYLSULFATE 1 MG/ML
INJECTION, SOLUTION INTRAVENOUS PRN
Status: DISCONTINUED | OUTPATIENT
Start: 2023-01-13 | End: 2023-01-13 | Stop reason: SDUPTHER

## 2023-01-13 RX ORDER — SODIUM CHLORIDE 9 MG/ML
INJECTION, SOLUTION INTRAVENOUS PRN
Status: DISCONTINUED | OUTPATIENT
Start: 2023-01-13 | End: 2023-01-13 | Stop reason: HOSPADM

## 2023-01-13 RX ORDER — MIDAZOLAM HYDROCHLORIDE 1 MG/ML
INJECTION INTRAMUSCULAR; INTRAVENOUS PRN
Status: DISCONTINUED | OUTPATIENT
Start: 2023-01-13 | End: 2023-01-13 | Stop reason: SDUPTHER

## 2023-01-13 RX ORDER — LABETALOL HYDROCHLORIDE 5 MG/ML
INJECTION, SOLUTION INTRAVENOUS PRN
Status: DISCONTINUED | OUTPATIENT
Start: 2023-01-13 | End: 2023-01-13 | Stop reason: SDUPTHER

## 2023-01-13 RX ORDER — FENTANYL CITRATE 50 UG/ML
INJECTION, SOLUTION INTRAMUSCULAR; INTRAVENOUS PRN
Status: DISCONTINUED | OUTPATIENT
Start: 2023-01-13 | End: 2023-01-13 | Stop reason: SDUPTHER

## 2023-01-13 RX ORDER — ROCURONIUM BROMIDE 10 MG/ML
INJECTION, SOLUTION INTRAVENOUS PRN
Status: DISCONTINUED | OUTPATIENT
Start: 2023-01-13 | End: 2023-01-13 | Stop reason: SDUPTHER

## 2023-01-13 RX ORDER — EPINEPHRINE 1 MG/ML(1)
AMPUL (ML) INJECTION PRN
Status: DISCONTINUED | OUTPATIENT
Start: 2023-01-13 | End: 2023-01-13 | Stop reason: ALTCHOICE

## 2023-01-13 RX ORDER — ONDANSETRON 2 MG/ML
INJECTION INTRAMUSCULAR; INTRAVENOUS PRN
Status: DISCONTINUED | OUTPATIENT
Start: 2023-01-13 | End: 2023-01-13 | Stop reason: SDUPTHER

## 2023-01-13 RX ORDER — DEXAMETHASONE SODIUM PHOSPHATE 10 MG/ML
INJECTION INTRAMUSCULAR; INTRAVENOUS PRN
Status: DISCONTINUED | OUTPATIENT
Start: 2023-01-13 | End: 2023-01-13 | Stop reason: SDUPTHER

## 2023-01-13 RX ORDER — GLYCOPYRROLATE 0.2 MG/ML
INJECTION INTRAMUSCULAR; INTRAVENOUS PRN
Status: DISCONTINUED | OUTPATIENT
Start: 2023-01-13 | End: 2023-01-13 | Stop reason: SDUPTHER

## 2023-01-13 RX ORDER — PROPOFOL 10 MG/ML
INJECTION, EMULSION INTRAVENOUS PRN
Status: DISCONTINUED | OUTPATIENT
Start: 2023-01-13 | End: 2023-01-13 | Stop reason: SDUPTHER

## 2023-01-13 RX ORDER — SODIUM CHLORIDE 9 MG/ML
INJECTION, SOLUTION INTRAVENOUS CONTINUOUS PRN
Status: DISCONTINUED | OUTPATIENT
Start: 2023-01-13 | End: 2023-01-13 | Stop reason: SDUPTHER

## 2023-01-13 RX ADMIN — SODIUM CHLORIDE: 9 INJECTION, SOLUTION INTRAVENOUS at 13:09

## 2023-01-13 RX ADMIN — LIDOCAINE HYDROCHLORIDE 100 MG: 20 INJECTION, SOLUTION INTRAVENOUS at 13:12

## 2023-01-13 RX ADMIN — SODIUM CHLORIDE: 9 INJECTION, SOLUTION INTRAVENOUS at 10:56

## 2023-01-13 RX ADMIN — SODIUM CHLORIDE: 9 INJECTION, SOLUTION INTRAVENOUS at 14:12

## 2023-01-13 RX ADMIN — MIDAZOLAM 1 MG: 1 INJECTION INTRAMUSCULAR; INTRAVENOUS at 13:28

## 2023-01-13 RX ADMIN — PROPOFOL 100 MCG/KG/MIN: 10 INJECTION, EMULSION INTRAVENOUS at 13:15

## 2023-01-13 RX ADMIN — FENTANYL CITRATE 50 MCG: 50 INJECTION, SOLUTION INTRAMUSCULAR; INTRAVENOUS at 13:12

## 2023-01-13 RX ADMIN — Medication 3 MG: at 14:21

## 2023-01-13 RX ADMIN — GLYCOPYRROLATE 0.6 MG: 0.2 INJECTION INTRAMUSCULAR; INTRAVENOUS at 14:21

## 2023-01-13 RX ADMIN — MIDAZOLAM 1 MG: 1 INJECTION INTRAMUSCULAR; INTRAVENOUS at 13:12

## 2023-01-13 RX ADMIN — DEXAMETHASONE SODIUM PHOSPHATE 10 MG: 10 INJECTION INTRAMUSCULAR; INTRAVENOUS at 13:19

## 2023-01-13 RX ADMIN — ONDANSETRON 4 MG: 2 INJECTION INTRAMUSCULAR; INTRAVENOUS at 13:19

## 2023-01-13 RX ADMIN — LABETALOL HYDROCHLORIDE 5 MG: 5 INJECTION INTRAVENOUS at 14:31

## 2023-01-13 RX ADMIN — PROPOFOL 200 MG: 10 INJECTION, EMULSION INTRAVENOUS at 13:12

## 2023-01-13 RX ADMIN — LABETALOL HYDROCHLORIDE 5 MG: 5 INJECTION INTRAVENOUS at 14:20

## 2023-01-13 RX ADMIN — ROCURONIUM BROMIDE 40 MG: 10 INJECTION, SOLUTION INTRAVENOUS at 13:12

## 2023-01-13 RX ADMIN — ROCURONIUM BROMIDE 10 MG: 10 INJECTION, SOLUTION INTRAVENOUS at 13:28

## 2023-01-13 RX ADMIN — FENTANYL CITRATE 50 MCG: 50 INJECTION, SOLUTION INTRAMUSCULAR; INTRAVENOUS at 13:30

## 2023-01-13 ASSESSMENT — PAIN - FUNCTIONAL ASSESSMENT: PAIN_FUNCTIONAL_ASSESSMENT: 0-10

## 2023-01-13 NOTE — H&P
Department of Internal Medicine  Pulmonary Medicine  Outpatient Procedure H&P    Procedure:  Bronchoscopy, EBUS, FNA, BAL, possible brushing    Indication: adenopathy, pet +    Referring  Physician:  Deni Theodore     Brief history: Hx of sarcoidosis and breast ca, now with PET + adenopathy    AllergiesLatex and Demerol hcl [meperidine]     Allergies noted: Yes     Vital signs reviewed:Yes    HEENT:normal  Cardiac:normal  Chest:normal  Abdomen:normal  Exts: normal  Neuro:normal    ASA:ASA 2 - Patient with mild systemic disease with no functional limitations    Sedation planned:anesthesia    Patient in acceptable condition for procedure:Yes        Daljit Srinivasan DO,

## 2023-01-13 NOTE — DISCHARGE INSTRUCTIONS
[address] INSTRUCTIONS    PROCEDURE COMPLETED:    You or your loved one has had a bronchoscopy with our without biospys. A bronchoscopy is the visual examination of the lungs and air passages, called the bronchial tubes. The exam is performed with a bronchoscope, an flexable instrument with a lighted tip and camera. A bronchoscope is also used to obtain tissue and secretion/fluid samples. This is a procedure helps diagnose lung related diseases and conditions, such as, cancer or an infection. Usually, you don't have to stay in the hospital, but an overnight stay may be necessary in some cases. You may be drowsy or lightheaded after receiving sedation or anesthesia. A responsible person should be with you for the next 24 hours. Please follow the instructions checked below:    DISCHARGE CARE INSTRUCTIONS:    GENERAL CARE:   [x] Immediately after the procedure, spit out any saliva until your throat is no longer numb. [x] If you are a smoker, Quit - its the best preventable health problem. [x] Expect a hoarse voice, sore neck and throat for a few days after the bronchoscopy. DIET INSTRUCTIONS:  [x] Start with light diet and progress to your normal diet as you feel like eating. If you experience nausea or repeated episodes of vomiting which persist beyond 6 hours, notify your doctor. [] Other     ACTIVITY INSTRUCTIONS:  [x] Rest today. Increase activity as tolerated. [x] If you take inhalers/puffers resume them immediately. [x] No heavy lifting or strenuous activity. [x] No driving for for at least 6 hours. [x] You may shower or bathe      [x] You may resume your normal activity tomorrow  [] Other         MEDICATION INSTRUCTIONS:  If you had to stop medicines before the procedure, ask your doctor when you can start again.  Medicines often stopped include:   Anti-inflammatory drugs (eg, aspirin, motrin, ibuprofen )   Blood thinners like clopidogrel (Plavix), dabigatran (Pradaxa) or warfarin (Coumadin)     If you are taking medications, follow these general guidelines:   [x] Other than blood thinner (mentioned above) RESUME your medications as directed. Please, do not change the amount or the schedule. [x] If you notice new changes report them to your doctor.     [] You may take a non-prescription \"headache\" or \"pain relief\", preferably one that does not contain aspirin for mild aches and pains. FOLLOW-UP CARE:  [] Please, Call [phone] & schedule a follow-up appointment. Typically your test/biospy results take 3 - 5 days to be completed. [x] Monitor your recovery once you leave the hospital, keep your health care provider informed of any changes. [x] Call Your Doctor If Any of the Following Occurs:    [x] Bleeding, progressive breathing problems, an irregular heart rate. [] Signs of infection, including fever and chills    [x] Cough, shortness of breath, or chest pain    [] Coughing up more than a teaspoon (5 milliliters) of blood   [x] Severe nausea or vomiting    [x] Increased or unusual wheezing   [] Pain that you can't control with the medications you've been given     In case of an emergency, call 911 immediately.      [address], DO  01/13/23

## 2023-01-13 NOTE — ANESTHESIA PRE PROCEDURE
Department of Anesthesiology  Preprocedure Note       Name:  Winnie Stewart   Age:  81 y.o.  :  1950                                          MRN:  [id number]         Date:  2023      Surgeon: Carlene Nance): Rex Batista DO    Procedure: Procedure(s):  BRONCHOSCOPY  BRONCHOSCOPY ENDOBRONCHIAL ULTRASOUND    Medications prior to admission:   Prior to Admission medications    Medication Sig Start Date End Date Taking?  Authorizing Provider   amitriptyline (ELAVIL) 10 MG tablet Take 20 mg by mouth nightly Takes two 10 mg tablets   Yes Historical Provider, MD   Multiple Vitamins-Minerals (CENTRUM SILVER 50+WOMEN PO) Take 1 tablet by mouth daily   Yes Historical Provider, MD   memantine (NAMENDA) 5 MG tablet Take 5 mg by mouth daily   Yes Historical Provider, MD   b complex vitamins capsule Take 1 capsule by mouth daily With vitamin c   Yes Historical Provider, MD   ipratropium-albuterol (DUONEB) 0.5-2.5 (3) MG/3ML SOLN nebulizer solution Inhale 3 mLs into the lungs every 4 hours  Patient taking differently: Inhale 1 vial into the lungs every 4 hours Uses 1-2 times daily prn 22   LANA Alfaro NP   bethanechol (URECHOLINE) 25 MG tablet take 1 tablet by mouth twice a day 19   Historical Provider, MD   atorvastatin (LIPITOR) 40 MG tablet take 1 tablet by mouth once daily 19   Historical Provider, MD   FLUoxetine (PROZAC) 40 MG capsule Take 40 mg by mouth daily 19   Historical Provider, MD   levothyroxine (SYNTHROID) 75 MCG tablet Take 75 mcg by mouth Daily 6/10/19   Historical Provider, MD   pantoprazole (PROTONIX) 40 MG tablet Take 20 mg by mouth daily 19   Historical Provider, MD   potassium citrate (UROCIT-K) 10 MEQ (1080 MG) extended release tablet 1 tablet three times a day 19   Historical Provider, MD       Current medications:    Current Facility-Administered Medications   Medication Dose Route Frequency Provider Last Rate Last Admin    sodium chloride flush 0.9 % injection 5-40 mL  5-40 mL IntraVENous 2 times per day Claudia PAREDES Howre, DO        sodium chloride flush 0.9 % injection 5-40 mL  5-40 mL IntraVENous PRN Daysi Prost Howsare, DO        0.9 % sodium chloride infusion   IntraVENous PRN Daysi Prost Howsare, DO 10 mL/hr at 01/13/23 1056 New Bag at 01/13/23 1056       Allergies: Allergies   Allergen Reactions    Latex Rash    Demerol Hcl [Meperidine]        Problem List:    Patient Active Problem List   Diagnosis Code    Sarcoidosis D86.9       Past Medical History:        Diagnosis Date    Breast cancer ([facility] [address]     CKD (chronic kidney disease)     Stage 4    Sarcoidosis        Past Surgical History:        Procedure Laterality Date    BREAST LUMPECTOMY Right     CHOLECYSTECTOMY      REFRACTIVE SURGERY Bilateral     cataracts       Social History:    Social History     Tobacco Use    Smoking status: Never    Smokeless tobacco: Never   Substance Use Topics    Alcohol use: Not Currently     Comment: occas                                Counseling given: Not Answered      Vital Signs (Current):   Vitals:    01/09/23 1404 01/13/23 1038   BP:  136/89   Pulse:  81   Resp:  20   Temp:  36.4 °C (97.6 °F)   TempSrc:  Temporal   SpO2:  99%   Weight: 126 lb 12.8 oz (57.5 kg) 126 lb (57.2 kg)   Height: 5' 5\" (1.651 m) 5' 5\" (1.651 m)                                              BP Readings from Last 3 Encounters:   01/13/23 136/89   12/12/22 126/68   11/22/22 (!) 140/86       NPO Status: Time of last liquid consumption: 2300                        Time of last solid consumption: 1800                        Date of last liquid consumption: 01/12/23                        Date of last solid food consumption: 01/12/23    BMI:   Wt Readings from Last 3 Encounters:   01/13/23 126 lb (57.2 kg)   12/12/22 127 lb (57.6 kg)   11/22/22 123 lb (55.8 kg)     Body mass index is 20.97 kg/m².     CBC:   Lab Results   Component Value Date/Time    WBC 9.1 01/13/2023 10:55 AM    RBC 3.48 2023 10:55 AM    HGB 10.4 2023 10:55 AM    HCT 32.4 2023 10:55 AM    MCV 93.1 2023 10:55 AM    RDW 14.2 2023 10:55 AM     2023 10:55 AM       CMP:   Lab Results   Component Value Date/Time     2022 10:59 AM    K 4.6 2022 10:59 AM     2022 10:59 AM    CO2 31 2022 10:59 AM    BUN 30 2022 10:59 AM    CREATININE 1.9 2022 10:59 AM    GFRAA 32 2019 03:21 PM    AGRATIO 1.7 2022 10:59 AM    LABGLOM 26 2022 10:59 AM    GLUCOSE 96 2022 10:59 AM    GLUCOSE 110 2012 08:46 AM    PROT 6.2 2022 10:59 AM    CALCIUM 9.5 2022 10:59 AM    BILITOT 0.7 2022 10:59 AM    ALKPHOS 57 2022 10:59 AM    AST 21 2022 10:59 AM    ALT 14 2022 10:59 AM       POC Tests: No results for input(s): POCGLU, POCNA, POCK, POCCL, POCBUN, POCHEMO, POCHCT in the last 72 hours.     Coags:   Lab Results   Component Value Date/Time    PROTIME 11.9 2023 10:55 AM    INR 1.1 2023 10:55 AM       HCG (If Applicable): No results found for: PREGTESTUR, PREGSERUM, HCG, HCGQUANT     ABGs: No results found for: PHART, PO2ART, EZW3DZS, EOI3KHL, BEART, I2UWJKGI     Type & Screen (If Applicable):  No results found for: LABABO, LABRH    Drug/Infectious Status (If Applicable):  No results found for: HIV, HEPCAB    COVID-19 Screening (If Applicable): No results found for: COVID19    EK23  Normal sinus rhythm  Low voltage QRS  Borderline ECG  No previous ECGs available    Anesthesia Evaluation  Patient summary reviewed history of anesthetic complications (delayed emergence ):   Airway: Mallampati: II  TM distance: >3 FB   Neck ROM: full  Mouth opening: > = 3 FB   Dental:    (+) implants      Pulmonary:   (+) wheezes: left                           ROS comment: Chronic cough      Cardiovascular:  Exercise tolerance: good (>4 METS),   (+) hyperlipidemia      ECG reviewed  Rhythm: regular  Rate: normal Neuro/Psych:   (+) neuromuscular disease (tingling in toes ):,             GI/Hepatic/Renal:   (+) hiatal hernia, GERD: well controlled, renal disease: CRI,           Endo/Other:    (+) hypothyroidism::., malignancy/cancer (breast cx previously on right side). Abdominal:             Vascular:           ROS comment: Previous DVT in leg . Other Findings:           Anesthesia Plan      general     ASA 2       Induction: intravenous. MIPS: Postoperative opioids intended and Prophylactic antiemetics administered. Anesthetic plan and risks discussed with patient. Use of blood products discussed with patient whom consented to blood products. Plan discussed with CRNA and attending. Elvira Mueller RN   1/13/2023      Pt seen, examined, chart reviewed, plan discussed.   Lou Irwin MD  1/13/2023  3:43 PM

## 2023-01-13 NOTE — ANESTHESIA POSTPROCEDURE EVALUATION
Department of Anesthesiology  Postprocedure Note    Patient: Estela Gilbert  MRN: [id number]  YOB: 1950  Date of evaluation: 1/13/2023      Procedure Summary     Date: 01/13/23 Room / Location: [facility]    Anesthesia Start: 5074 Anesthesia Stop: 1444    Procedures:       BRONCHOSCOPY      BRONCHOSCOPY W/EBUS FNA      BRONCHOSCOPY ALVEOLAR LAVAGE Diagnosis:       Irvine lesion      (/)    Surgeons: Rachell Corrales DO Responsible Provider: Mickie Phalen, MD    Anesthesia Type: general ASA Status: 2          Anesthesia Type: No value filed.     Nicola Phase I: Nicola Score: 9    Nicola Phase II:        Anesthesia Post Evaluation    Patient location during evaluation: PACU  Patient participation: complete - patient participated  Level of consciousness: awake  Pain score: 3  Airway patency: patent  Nausea & Vomiting: no nausea and no vomiting  Complications: no  Cardiovascular status: blood pressure returned to baseline  Respiratory status: acceptable  Hydration status: euvolemic

## 2023-01-14 LAB — GRAM STAIN ORDERABLE: NORMAL

## 2023-01-15 LAB
CULTURE, RESPIRATORY: ABNORMAL
CULTURE, RESPIRATORY: ABNORMAL
ORGANISM: ABNORMAL
SMEAR, RESPIRATORY: ABNORMAL

## 2023-01-17 LAB — AFB SMEAR: NORMAL

## 2023-01-18 LAB — FUNGUS STAIN: NORMAL

## 2023-01-26 ENCOUNTER — TELEPHONE (OUTPATIENT)
Dept: PULMONOLOGY | Age: 73
End: 2023-01-26

## 2023-01-26 NOTE — TELEPHONE ENCOUNTER
A message was left with the patient's sister as per the request of the patient to call the office so that she can be given the results of the patient's bronchoscopy.

## 2023-01-27 LAB
APPEARANCE FLUID: NORMAL
BASO FLUID: 0 %
CELL COUNT FLUID TYPE: NORMAL
COLOR FLUID: COLORLESS
EOSINOPHIL FLUID: 12 %
LYMPHOCYTES, BODY FLUID: 5 %
MONOCYTE, FLUID: 50 %
NEUTROPHIL, FLUID: 33 %
NUCLEATED CELLS FLUID: 198 /UL
RBC FLUID: <2000 /UL

## 2023-01-30 ENCOUNTER — TELEPHONE (OUTPATIENT)
Dept: PULMONOLOGY | Age: 73
End: 2023-01-30

## 2023-01-30 DIAGNOSIS — R05.3 CHRONIC COUGH: Primary | ICD-10-CM

## 2023-01-30 DIAGNOSIS — J40 BRONCHITIS: ICD-10-CM

## 2023-01-30 RX ORDER — DOXYCYCLINE HYCLATE 100 MG
100 TABLET ORAL 2 TIMES DAILY
Qty: 14 TABLET | Refills: 0 | Status: SHIPPED | OUTPATIENT
Start: 2023-01-30 | End: 2023-02-06

## 2023-01-30 NOTE — TELEPHONE ENCOUNTER
Patient's sister Fern Neely message was returned. Patient had given us permission to speak with Ms Sylvain Thomas regarding the results of the bronchoscopy. Ms Sylvain Thomas was notified that the path report was negative, however the BAL showed a light growth of staph that was sensitive to everything. Patient's sister stated that she has not been on any antibiotics in Nov, Dec, or January. Doxycycline 100mg po bid for 7 days.

## 2023-02-13 LAB
FUNGUS (MYCOLOGY) CULTURE: NORMAL
FUNGUS STAIN: NORMAL

## 2023-02-13 NOTE — OP NOTE
Operative Note      Patient: Viv Fong  YOB: 1950  MRN: [id number]    Date of Procedure: 1/13/2023    Pre-Op Diagnosis: /Lymphadenopathy, history of breast ca. Post-Op Diagnosis: Same       Procedure(s):  BRONCHOSCOPY  BRONCHOSCOPY W/EBUS FNA  BRONCHOSCOPY ALVEOLAR LAVAGE    Surgeon(s): Janey Christina DO    Assistant:   * No surgical staff found *    Anesthesia: General    Estimated Blood Loss (mL): Minimal    Complications: None    Specimens:   ID Type Source Tests Collected by Time Destination   1 : right middle lobe BAL  Respiratory BAL- Bronch. Lavage CELL COUNT WITH DIFFERENTIAL, BODY FLUID, CULTURE, FUNGUS, GRAM STAIN (Canceled), CULTURE WITH SMEAR, ACID FAST BACILLIUS, CULTURE, RESPIRATORY Janey Christina, DO 1/13/2023 1417    A : right middle lobe BAL  Respiratory BAL- Bronch. Lavage CYTOLOGY, NON-GYN Janey Christina, DO 1/13/2023 1409    B : mary lymph node FNA Tissue Fine Needle Aspirate CYTOLOGY, NON-GYN Fitz Srinivasan, DO 1/13/2023 1418        Implants:  * No implants in log *      Drains: * No LDAs found *    Findings: Thin tenacious secretions. Detailed Description of Procedure: Indications and History:  The patient is a 81 y.o. female with adenopathy, history of breast ca, history of sarcoidosis. The risks, benefits, complications, treatment options and expected outcomes were discussed with the patient. The possibilities of reaction to medication, pulmonary aspiration, perforation of a viscus(Pneumothorax), bleeding, respiratory failure and failure to diagnose a condition and creating a complication requiring transfusion or operation were discussed with the patient who freely signed the consent. Description of Procedure: The patient was taken to the endoscopy suite, identified as Hans Fong and the procedure verified as Flexible Fiberoptic Bronchoscopy. A Time Out was held and the above information confirmed.      After the induction of general anesthesia, the patient was positioned supine and the bronchoscope was passed through the endotracheal tube. Additional 2% lidocaine was used topically within the airway. Careful inspection of the tracheal lumen was accomplished. The scope was sequentially passed into all airways. Endobronchial findings: thin tenacious secretions  Trachea: Normal mucosa  Laura: Normal mucosa  Right main bronchus: thin yellow secretions present  Right upper lobe bronchus including anterior, apical and posterior uptill 4th generation bronchi: Normal mucosa  Right intermedius bronchus: thin yellow secretions present  Right lower lobe basilar segments uptill  4th generation  bronchii: thin yellow secretions present, therapeutically suctioned. Right lower lobe superior segment uptill 3rd generation bronchi: Thin yellow secretions therapeutically suctioned  Left main bronchus: Normal mucosa  Left upper lobe including Lingula, anterior segment, and apico-posterior segment uptill 4th generation bronchi: Normal mucosa  Left lower lobe basilar and superior segments uptill 4th generation bronchi: Normal mucosa      BAL was completed from the right middle lobe with instillation of 120 mL of saline and return of 63. This was split for both micro and cytology. Next the regular scope was withdrawn and the EBUS scope was introduced through the endotracheal tube. A total of 9 samples were taken from station 7 lymph node. Rapid onsite evaluation was negative for malignant cells final pathology pending    Samples: Right middle lobe BAL  FNA from station 7    The Patient was taken to the Endoscopy Recovery area in satisfactory condition.         Ita Matthews DO          Electronically signed by Ita Matthews DO on 2/13/2023 at 8:20 AM

## 2023-02-14 LAB
AFB CULTURE (MYCOBACTERIA): NORMAL
AFB SMEAR: NORMAL

## 2024-01-25 ENCOUNTER — HOSPITAL ENCOUNTER (OUTPATIENT)
Dept: HOSPITAL 83 - TRNFUSION | Age: 74
Discharge: HOME | End: 2024-01-25
Attending: INTERNAL MEDICINE
Payer: COMMERCIAL

## 2024-01-25 VITALS — DIASTOLIC BLOOD PRESSURE: 71 MMHG | SYSTOLIC BLOOD PRESSURE: 143 MMHG

## 2024-01-25 VITALS — SYSTOLIC BLOOD PRESSURE: 133 MMHG | DIASTOLIC BLOOD PRESSURE: 72 MMHG

## 2024-01-25 VITALS — SYSTOLIC BLOOD PRESSURE: 150 MMHG | DIASTOLIC BLOOD PRESSURE: 75 MMHG

## 2024-01-25 VITALS — DIASTOLIC BLOOD PRESSURE: 76 MMHG

## 2024-01-25 VITALS — SYSTOLIC BLOOD PRESSURE: 140 MMHG | DIASTOLIC BLOOD PRESSURE: 67 MMHG

## 2024-01-25 VITALS — SYSTOLIC BLOOD PRESSURE: 138 MMHG | DIASTOLIC BLOOD PRESSURE: 72 MMHG

## 2024-01-25 VITALS — DIASTOLIC BLOOD PRESSURE: 73 MMHG | SYSTOLIC BLOOD PRESSURE: 139 MMHG

## 2024-01-25 VITALS — SYSTOLIC BLOOD PRESSURE: 132 MMHG | DIASTOLIC BLOOD PRESSURE: 64 MMHG

## 2024-01-25 VITALS — DIASTOLIC BLOOD PRESSURE: 72 MMHG | SYSTOLIC BLOOD PRESSURE: 133 MMHG

## 2024-01-25 VITALS — DIASTOLIC BLOOD PRESSURE: 79 MMHG

## 2024-01-25 VITALS — DIASTOLIC BLOOD PRESSURE: 75 MMHG

## 2024-01-25 VITALS — DIASTOLIC BLOOD PRESSURE: 64 MMHG

## 2024-01-25 VITALS — DIASTOLIC BLOOD PRESSURE: 70 MMHG | SYSTOLIC BLOOD PRESSURE: 145 MMHG

## 2024-01-25 VITALS — DIASTOLIC BLOOD PRESSURE: 84 MMHG

## 2024-01-25 DIAGNOSIS — Z88.8: ICD-10-CM

## 2024-01-25 DIAGNOSIS — F03.90: ICD-10-CM

## 2024-01-25 DIAGNOSIS — Z85.3: ICD-10-CM

## 2024-01-25 DIAGNOSIS — D64.9: Primary | ICD-10-CM

## 2024-01-25 DIAGNOSIS — Z85.828: ICD-10-CM

## 2024-01-25 DIAGNOSIS — Z91.040: ICD-10-CM

## 2025-03-31 LAB
A/G RATIO: 1.7 RATIO (ref 1.1–2.2)
ALBUMIN: 3.9 G/DL (ref 3.5–5)
ALP BLD-CCNC: 85 U/L (ref 42–121)
ALT SERPL-CCNC: 13 U/L (ref 7–52)
ANION GAP SERPL CALCULATED.3IONS-SCNC: 6 MEQ/L (ref 4–14)
AST SERPL-CCNC: 24 U/L (ref 10–41)
BASOPHILS ABSOLUTE: 0 K/UL (ref 0–0.2)
BASOPHILS RELATIVE PERCENT: 0.4 % (ref 0–1.5)
BILIRUB SERPL-MCNC: 0.3 MG/DL (ref 0.3–1.5)
BUN BLDV-MCNC: 20 MG/DL (ref 7–25)
CALCIUM SERPL-MCNC: 9.5 MG/DL (ref 8.5–10.5)
CHLORIDE BLD-SCNC: 104 MEQ/L (ref 98–107)
CO2: 31 MEQ/L (ref 21–31)
CREAT SERPL-MCNC: 1.94 MG/DL (ref 0.6–1.2)
CREATININE + EGFR PANEL: 31 ML/MIN
CREATININE URINE: 157.15 MG/DL
EOSINOPHILS ABSOLUTE: 0.3 K/UL (ref 0–0.33)
EOSINOPHILS RELATIVE PERCENT: 4.6 % (ref 0–3)
GFR NON-AFRICAN AMERICAN: 25 ML/MIN
GLOBULIN: 2.3 G/DL (ref 1.9–3.9)
GLUCOSE BLD-MCNC: 107 MG/DL (ref 70–99)
HCT VFR BLD CALC: 33.7 % (ref 36–44)
HEMOGLOBIN: 11.2 G/DL (ref 12–15)
LYMPHOCYTES ABSOLUTE: 2.7 K/UL (ref 1.1–4.8)
LYMPHOCYTES RELATIVE PERCENT: 38.2 % (ref 24–44)
MAGNESIUM: 1.4 MEQ/L (ref 1.6–2.6)
MCH RBC QN AUTO: 31.3 PG (ref 28–34)
MCHC RBC AUTO-ENTMCNC: 33.3 G/DL (ref 33–37)
MCV RBC AUTO: 94.1 FL (ref 80–100)
MONOCYTES ABSOLUTE: 0.7 K/UL (ref 0.2–0.7)
MONOCYTES RELATIVE PERCENT: 9.4 % (ref 3.4–9)
NEUTROPHILS ABSOLUTE: 3.3 K/UL (ref 1.83–8.7)
NEUTROPHILS RELATIVE PERCENT: 47.4 % (ref 40–74)
PDW BLD-RTO: 13.8 % (ref 10.9–14.3)
PHOSPHORUS: 3.4 MG/DL (ref 2.5–4.6)
PLATELET # BLD: 220 K/UL (ref 150–450)
PMV BLD AUTO: 7.3 FL (ref 7.4–10.4)
POTASSIUM SERPL-SCNC: 4.4 MEQ/L (ref 3.6–5)
PROTEIN, URINE, RANDOM: 21 MG/DL (ref 0–9)
PROTEIN/CREAT RATIO URINE RAN: 133.6 MG/G (ref 0–199.9)
RBC # BLD: 3.58 M/UL (ref 4–4.9)
SODIUM BLD-SCNC: 141 MEQ/L (ref 135–145)
TOTAL PROTEIN: 6.2 G/DL (ref 6.2–8)
URATE: 9.8 MG/DL (ref 2.3–6.6)
WBC # BLD: 7.1 K/UL (ref 4.5–11)

## (undated) DEVICE — DOUBLE  SWIVEL ELBOW 15M - DOUBLE FLIP TOP CAP WITH SEAL - 22M/15F: Brand: DOUBLE  SWIVEL ELBOW 15M - DOUBLE FLIP TOP CAP WITH SEAL - 22M/15F

## (undated) DEVICE — BRONCHOSCOPY PACK: Brand: MEDLINE INDUSTRIES, INC.

## (undated) DEVICE — NEEDLE ASPIR 700X2 MM DISP

## (undated) DEVICE — SET EXTN IV L30IN TBNG DIA0.1IN PRIMING 4ML MACBOR FEM ADPT

## (undated) DEVICE — Device: Brand: MEDEX

## (undated) DEVICE — SINGLE USE SUCTION VALVE MAJ-209: Brand: SINGLE USE SUCTION VALVE (STERILE)

## (undated) DEVICE — SYRINGE MED 50ML LUERLOCK TIP

## (undated) DEVICE — SOLUTION IRRIG 500ML 0.9% SOD CHLO USP POUR PLAS BTL

## (undated) DEVICE — ADAPTER TBNG DIA15MM SWVL FBROPT BRONCHSCP TERM 2 AXIS PEEP

## (undated) DEVICE — Device: Brand: SINGLE USE GUIDE SHEATH KIT

## (undated) DEVICE — SINGLE USE BIOPSY VALVE MAJ-210: Brand: SINGLE USE BIOPSY VALVE (STERILE)